# Patient Record
Sex: FEMALE | Race: WHITE | Employment: PART TIME | URBAN - METROPOLITAN AREA
[De-identification: names, ages, dates, MRNs, and addresses within clinical notes are randomized per-mention and may not be internally consistent; named-entity substitution may affect disease eponyms.]

---

## 2017-03-07 ENCOUNTER — OFFICE VISIT (OUTPATIENT)
Dept: INTERNAL MEDICINE CLINIC | Age: 57
End: 2017-03-07

## 2017-03-07 ENCOUNTER — OFFICE VISIT (OUTPATIENT)
Dept: ORTHOPEDIC SURGERY | Age: 57
End: 2017-03-07

## 2017-03-07 VITALS
RESPIRATION RATE: 16 BRPM | TEMPERATURE: 98.1 F | HEIGHT: 62 IN | DIASTOLIC BLOOD PRESSURE: 86 MMHG | OXYGEN SATURATION: 97 % | WEIGHT: 120 LBS | HEART RATE: 89 BPM | SYSTOLIC BLOOD PRESSURE: 136 MMHG | BODY MASS INDEX: 22.08 KG/M2

## 2017-03-07 VITALS
RESPIRATION RATE: 16 BRPM | OXYGEN SATURATION: 98 % | DIASTOLIC BLOOD PRESSURE: 85 MMHG | SYSTOLIC BLOOD PRESSURE: 128 MMHG | HEART RATE: 86 BPM | TEMPERATURE: 98.4 F | HEIGHT: 62 IN | BODY MASS INDEX: 22.08 KG/M2 | WEIGHT: 120 LBS

## 2017-03-07 VITALS
WEIGHT: 120 LBS | HEART RATE: 86 BPM | DIASTOLIC BLOOD PRESSURE: 85 MMHG | TEMPERATURE: 98.4 F | SYSTOLIC BLOOD PRESSURE: 128 MMHG | OXYGEN SATURATION: 98 % | HEIGHT: 62 IN | RESPIRATION RATE: 16 BRPM | BODY MASS INDEX: 22.08 KG/M2

## 2017-03-07 DIAGNOSIS — S92.302A AVULSION FRACTURE OF METATARSAL BONE OF LEFT FOOT, CLOSED, INITIAL ENCOUNTER: Primary | ICD-10-CM

## 2017-03-07 DIAGNOSIS — S92.352A CLOSED FRACTURE OF FIFTH METATARSAL BONE OF LEFT FOOT, PHYSEAL INVOLVEMENT UNSPECIFIED, INITIAL ENCOUNTER: Primary | ICD-10-CM

## 2017-03-07 DIAGNOSIS — M79.672 LEFT FOOT PAIN: ICD-10-CM

## 2017-03-07 PROBLEM — S92.353A CLOSED FRACTURE OF FIFTH METATARSAL BONE: Status: ACTIVE | Noted: 2017-03-07

## 2017-03-07 RX ORDER — HYDROCODONE BITARTRATE AND ACETAMINOPHEN 5; 325 MG/1; MG/1
1 TABLET ORAL
Qty: 45 TAB | Refills: 0 | Status: SHIPPED | OUTPATIENT
Start: 2017-03-07

## 2017-03-07 NOTE — PROGRESS NOTES
HISTORY OF PRESENT ILLNESS  Moody Guardado is a 60 y.o. female. Foot Pain   The history is provided by the patient. This is a new problem. The current episode started 1 to 2 hours ago. The problem occurs constantly. The problem has not changed since onset. Pertinent negatives include no chest pain, no headaches and no shortness of breath. Associated symptoms comments: Swelling, limited ROM. The symptoms are aggravated by walking, standing, bending and twisting. Nothing relieves the symptoms. She has tried a cold compress for the symptoms. The treatment provided no relief. Review of Systems   Constitutional: Negative for chills, fever and malaise/fatigue. Respiratory: Negative for shortness of breath. Cardiovascular: Negative for chest pain and palpitations. Musculoskeletal: Positive for joint pain. Negative for back pain, falls, myalgias and neck pain. Neurological: Negative for dizziness, tingling and headaches. Endo/Heme/Allergies: Negative for polydipsia. Psychiatric/Behavioral: The patient is not nervous/anxious. Physical Exam   Constitutional: She is oriented to person, place, and time. She appears well-developed and well-nourished. No distress. HENT:   Head: Normocephalic and atraumatic. Eyes: Pupils are equal, round, and reactive to light. Neck: Neck supple. Cardiovascular: Regular rhythm. Pulmonary/Chest: Breath sounds normal. No respiratory distress. Musculoskeletal:        Left ankle: She exhibits decreased range of motion. She exhibits no swelling and no deformity. No tenderness. No lateral malleolus and no medial malleolus tenderness found. Left foot: There is decreased range of motion, tenderness, bony tenderness and swelling. There is normal capillary refill, no crepitus and no laceration. Neurological: She is alert and oriented to person, place, and time. No cranial nerve deficit. Skin: Skin is warm. She is not diaphoretic. No erythema. Psychiatric: She has a normal mood and affect. Nursing note and vitals reviewed. ASSESSMENT and PLAN    ICD-10-CM ICD-9-CM    1. Closed fracture of fifth metatarsal bone of left foot, physeal involvement unspecified, initial encounter S92.352A 825.25 REFERRAL TO ORTHOPEDICS   2. Left foot pain M79.672 729.5 XR FOOT LT MIN 3 V      XR ANKLE LT MIN 3 V      REFERRAL TO ORTHOPEDICS      CANCELED: XR ANKLE LT AP/LAT   patient will be further evaluated and care plan will be established by orthopedic specialist. Most of the time spent was in co ordination of care. Will follow up if necessary.

## 2017-03-07 NOTE — Clinical Note
Thank you very much for sending me this patient. Please see my note for details. I plan to follow until resolved.

## 2017-03-07 NOTE — MR AVS SNAPSHOT
Visit Information Date & Time Provider Department Dept. Phone Encounter #  
 3/7/2017  1:15 PM KRISTINE [facility] [address] [id number] Upcoming Health Maintenance Date Due Hepatitis C Screening 1960 DTaP/Tdap/Td series (1 - Tdap) 9/18/1979 BREAST CANCER SCRN MAMMOGRAM 9/18/2008 FOBT Q 1 YEAR AGE 50-75 9/18/2008 PAP AKA CERVICAL CYTOLOGY 7/28/2014 INFLUENZA AGE 9 TO ADULT 8/1/2016 Allergies as of 3/7/2017  Review Complete On: 3/7/2017 By: Jadyn Munguia LPN Severity Noted Reaction Type Reactions Celebrex [Celecoxib]  06/20/2016    Diarrhea Current Immunizations  Never Reviewed No immunizations on file. Not reviewed this visit You Were Diagnosed With   
  
 Codes Comments Left foot pain    -  Primary ICD-10-CM: U33.490 ICD-9-CM: 729.5 Vitals BP Pulse Temp Resp Height(growth percentile) Weight(growth percentile) 136/86 (BP 1 Location: Right arm, BP Patient Position: Sitting) 89 98.1 °F (36.7 °C) (Oral) 16 5' 2\" (1.575 m) 120 lb (54.4 kg) SpO2 BMI OB Status Smoking Status 97% 21.95 kg/m2 Hysterectomy Former Smoker Vitals History BMI and BSA Data Body Mass Index Body Surface Area  
 21.95 kg/m 2 1.54 m 2 Preferred Pharmacy Pharmacy Name Phone [facility] [address] [phone] Your Updated Medication List  
  
   
This list is accurate as of: 3/7/17  2:00 PM.  Always use your most recent med list.  
  
  
  
  
 * ASACOL  mg DR tablet Generic drug:  mesalamine DR Take 800 mg by mouth nightly. Indications: ULCERATIVE COLITIS  
  
 * mesalamine  mg DR tablet Commonly known as:  ASACOL HD Take 1,600 mg by mouth two (2) times a day. 1600 MG AM, 800 MG PM  Indications: ULCERATIVE COLITIS buPROPion  mg SR tablet Commonly known as:  Dariana Larios  
 150 mg two (2) times a day. Indications: depression  
  
 cetirizine 10 mg tablet Commonly known as:  ZYRTEC Take 10 mg by mouth nightly. Indications: ALLERGIC RHINITIS Desvenlafaxine 100 mg Tb24 Take 100 mg by mouth daily. Indications: depression * fluticasone 110 mcg/actuation inhaler Commonly known as:  FLOVENT HFA Take 2 Puffs by inhalation every twelve (12) hours. Indications: asthma * fluticasone 50 mcg/actuation nasal spray Commonly known as:  Dorian Riser 2 Sprays by Both Nostrils route nightly. Indications: ALLERGIC RHINITIS, can take AM dose if feels like she needs it  
  
 glucagon 1 mg injection Commonly known as:  GLUCAGEN  
1 mg as needed. Indications: emergency treatment for if hypoglycemic  
  
 insulin lispro 100 unit/mL injection Commonly known as:  HUMALOG  
by SubCUTAneous route. Via insulin pump basal - (midnight- 09:30am = .80mg/hr)  (09:30am - midnight -.65mg/hr) LIPITOR 20 mg tablet Generic drug:  atorvastatin Take 30 mg by mouth nightly. Indications: HYPERCHOLESTEROLEMIA LORazepam 0.5 mg tablet Commonly known as:  ATIVAN Take 0.5 mg by mouth every four (4) hours as needed for Anxiety. multivitamin tablet Commonly known as:  ONE A DAY Take 1 Tab by mouth daily. oxyCODONE-acetaminophen 5-325 mg per tablet Commonly known as:  PERCOCET Take 1 to 2 tab PO q 4-6 hrs prn pain VENTOLIN HFA 90 mcg/actuation inhaler Generic drug:  albuterol 2 Puffs every four (4) hours as needed. Indications: ACUTE ASTHMA ATTACK  
  
 VITAMIN D3 2,000 unit Cap capsule Generic drug:  Cholecalciferol (Vitamin D3) Take  by Mouth Once a Day. * Notice: This list has 4 medication(s) that are the same as other medications prescribed for you. Read the directions carefully, and ask your doctor or other care provider to review them with you. We Performed the Following REFERRAL TO ORTHOPEDICS [YGM308 Custom] Comments: Please evaluate patient for foot pain To-Do List   
 03/07/2017 Imaging:  XR FOOT LT MIN 3 V As directed Imaging:  XR ANKLE LT MIN 3 V Referral Information Referral ID Referred By Referred To  
  
 [id number], [address] [facility] Phone: [phone] Fax: [phone] Visits Status Start Date End Date 1 New Request 3/7/17 3/7/18 If your referral has a status of pending review or denied, additional information will be sent to support the outcome of this decision. Introducing [facility]! Dear Virginia Díaz: Thank you for requesting a [employer] account. Our records indicate that you already have an active [employer] account. You can access your account anytime at [email or web address] Did you know that you can access your hospital and ER discharge instructions at any time in [employer]? You can also review all of your test results from your hospital stay or ER visit. Additional Information If you have questions, please visit the Frequently Asked Questions section of the [employer] website at [email or web address] Remember, [employer] is NOT to be used for urgent needs. For medical emergencies, dial 911. Now available from your iPhone and Android! Please provide this summary of care documentation to your next provider. Your primary care clinician is listed as Gold Bailey. If you have any questions after today's visit, please call [phone].

## 2017-03-07 NOTE — PROGRESS NOTES
1. Have you been to the ER, urgent care clinic since your last visit? Hospitalized since your last visit? new to  practice    2. Have you seen or consulted any other health care providers outside of the [address] [place] [address] since your last visit? Include any pap smears or colon screening.   new to  practice

## 2017-03-07 NOTE — PATIENT INSTRUCTIONS
Fifth Metatarsal Fracture: Rehab Exercises  Your Care Instructions  Here are some examples of typical rehabilitation exercises for your condition. Start each exercise slowly. Ease off the exercise if you start to have pain. Your doctor or physical therapist will tell you when you can start these exercises and which ones will work best for you. How to do the exercises  Calf wall stretch (back knee straight)    1. Stand facing a wall with your hands on the wall at about eye level. Put your affected foot about a step behind your other foot. 2. Keeping your back leg straight and your back heel on the floor, bend your front knee and gently bring your hip and chest toward the wall until you feel a stretch in the calf of your back leg. 3. Hold the stretch for at least 15 to 30 seconds. 4. Repeat 2 to 4 times. Calf wall stretch (knees bent)    1. Stand facing a wall with your hands on the wall at about eye level. Put your affected foot about a step behind your other foot. 2. Keeping both heels on the floor, bend both knees. Then gently bring your hip and chest toward the wall until you feel a stretch in the calf of your back leg. 3. Hold the stretch for at least 15 to 30 seconds. 4. Repeat 2 to 4 times. [place] pick-ups    1. Put some marbles on the floor next to a cup.  2. Sit in a chair, and use the toes of your affected foot to lift up one marble from the floor at a time. Then try to put the marble in the cup.  3. Repeat 8 to 12 times. Towel scrunches    1. Sit in a chair, and place both feet on a towel on the floor. 2. Scrunch the towel toward you with your toes. Then use your toes to push the towel back into place. 3. Repeat 8 to 12 times. Towel inversion and eversion    1. Sit in a chair, and place both feet on a towel on the floor. 2. Swivel your feet from side to side to slide the towel. First slide your toes, then your heels, as you move the towel with your feet.  Then change directions and swivel your feet from side to side to slide the towel back to the starting position. 3. Repeat 8 to 12 times. Resisted ankle inversion    1. Sit on the floor with your good foot crossed over your affected foot. 2. Hold both ends of an exercise band, and loop the band around the inside of your affected foot. Then press your other foot against the band. 3. Keeping your feet crossed, slowly push your affected foot against the band so that foot moves away from your other foot. Then slowly relax. 4. Repeat 8 to 12 times. Resisted ankle eversion    1. Sit on the floor with your legs straight. 2. Hold both ends of an exercise band, and loop the band around the outside of your affected foot. Then press your other foot against the band. 3. Keeping your leg straight, slowly push your affected foot outward against the band and away from your other foot without letting your leg rotate. Then slowly relax. 4. Repeat 8 to 12 times. Follow-up care is a key part of your treatment and safety. Be sure to make and go to all appointments, and call your doctor if you are having problems. It's also a good idea to know your test results and keep a list of the medicines you take. Where can you learn more? Go to http://michel-dallin.info/. Enter [phone] in the search box to learn more about \"Fifth Metatarsal Fracture: Rehab Exercises. \"  Current as of: May 23, 2016  Content Version: 11.1  © 3169-8997 [employer]. Care instructions adapted under license by [employer] (which disclaims liability or warranty for this information). If you have questions about a medical condition or this instruction, always ask your healthcare professional. [address] any warranty or liability for your use of this information.

## 2017-03-07 NOTE — PROGRESS NOTES
Pt presented today with foot pain, pt reports she fell off a curb one hour ago and now has pain to left foot. Has pt had any falls since last visit? Yes 03/. Pt preferred language for health care discussion is english. Advanced Directive? No    Is someone accompanying this pt? Yes     Is the patient using any DME equipment during [address]? Yes wheelchair      1. Have you been to the ER, urgent care clinic since your last visit? Hospitalized since your last visit? No    2. Have you seen or consulted any other health care providers outside of the [facility] since your last visit? Include any pap smears or colon screening. No      Pt has a reminder for a \"due or due soon\" health maintenance. I have asked that she contact his primary care provider for follow-up on this health maintenance.

## 2017-03-07 NOTE — PROGRESS NOTES
HISTORY OF PRESENT ILLNESS    Virginia Díaz is a 60y.o. year old female comes in today as new patient to be evaluated and treated at the request of Roger Pennington for my opinion/advice regarding: left foot pain    Was walking today and went over a curb and rolled ankle and  A lot of pain left lateral foot. Swelled and unable to walk on it so carried here and wheelchair after. IMAGING: left foot xray 3/7/17 avulsion Fx proximal 5th metatarsal    Social History     Social History    Marital status:      Spouse name: N/A    Number of children: N/A    Years of education: N/A     Occupational History    dance teacher      Social History Main Topics    Smoking status: Former Smoker    Smokeless tobacco: Never Used    Alcohol use 0.6 oz/week     1 Glasses of wine per week    Drug use: No    Sexual activity: Yes     Partners: Male     Birth control/ protection: None     Other Topics Concern    None     Social History Narrative     Current Outpatient Prescriptions   Medication Sig Dispense Refill    mesalamine DR (ASACOL HD) 800 mg DR tablet Take 1,600 mg by mouth two (2) times a day. 1600 MG AM, 800 MG PM  Indications: ULCERATIVE COLITIS      mesalamine DR (ASACOL HD) 800 mg DR tablet Take 800 mg by mouth nightly. Indications: ULCERATIVE COLITIS      multivitamin (ONE A DAY) tablet Take 1 Tab by mouth daily.  insulin lispro (HUMALOG) 100 unit/mL injection by SubCUTAneous route. Via insulin pump  basal - (midnight- 09:30am = .80mg/hr)   (09:30am - midnight -.65mg/hr)      LORazepam (ATIVAN) 0.5 mg tablet Take 0.5 mg by mouth every four (4) hours as needed for Anxiety.  Desvenlafaxine 100 mg Tb24 Take 100 mg by mouth daily. Indications: depression      fluticasone (FLONASE) 50 mcg/actuation nasal spray 2 Sprays by Both Nostrils route nightly.  Indications: ALLERGIC RHINITIS, can take AM dose if feels like she needs it      albuterol (VENTOLIN HFA) 90 mcg/actuation inhaler 2 Puffs every four (4) hours as needed. Indications: ACUTE ASTHMA ATTACK      atorvastatin (LIPITOR) 20 mg tablet Take 30 mg by mouth nightly. Indications: HYPERCHOLESTEROLEMIA      buPROPion SR (WELLBUTRIN SR) 150 mg SR tablet 150 mg two (2) times a day. Indications: depression      cetirizine (ZYRTEC) 10 mg tablet Take 10 mg by mouth nightly. Indications: ALLERGIC RHINITIS      Cholecalciferol, Vitamin D3, (VITAMIN D3) 2,000 unit cap capsule Take  by Mouth Once a Day.  fluticasone (FLOVENT HFA) 110 mcg/actuation inhaler Take 2 Puffs by inhalation every twelve (12) hours. Indications: asthma      glucagon (GLUCAGEN) 1 mg injection 1 mg as needed. Indications: emergency treatment for if hypoglycemic      oxyCODONE-acetaminophen (PERCOCET) 5-325 mg per tablet Take 1 to 2 tab PO q 4-6 hrs prn pain 60 Tab 0     Past Medical History:   Diagnosis Date    Arthritis     Asthma     [facility]immune disease ([facility] [address]     colitis    Bronchitis     Contact dermatitis and other eczema, due to unspecified cause     Depression     Diabetes ([facility] [address] 2012    Hypercholesterolemia     Osteoarthritis of left hip 6/14/2016    Osteoarthritis of right hip 11/4/2016     Family History   Problem Relation Age of Onset    Cancer Mother     Elevated Lipids Mother     Cancer Father     Heart Disease Father     Hypertension Father     Elevated Lipids Father      ROS:  All other systems reviewed and negative aside from that written in the HPI. Objective:  Visit Vitals    /85 (BP 1 Location: Right arm, BP Patient Position: Sitting)    Pulse 86    Temp 98.4 °F (36.9 °C) (Oral)    Resp 16    Ht 5' 2\" (1.575 m)    Wt 120 lb (54.4 kg)    SpO2 98%    BMI 21.95 kg/m2     HEENT: Conjunctiva/lids WNL. External canals/nares WNL. Tongue midline. PERRL, EOMI. Hearing intact. NECK: Trachea midline. Supple, Full ROM. CARDIAC: RRR. S1S2. No Murmur. LUNGS: CTAB w/ normal effort. ABD: Soft, NT. No HSM. PSYCH: A+O x3.  Appropriate judgment and insight. GEN: Appears stated age in NAD. HEAD:  Normocephalic, atraumatic. NEURO:  Sensation intact light touch B/L lower extremities. MS:  Strength normal throughout upper and lower extremities bilateral .   left ankle/foot:  Positive tenderness at proximal 5th metatarsal.   Anterior drawer test negative. Talar tilt negative. Kleiger test negative. Syndesmosis squeeze negative. negative fibular head tenderness. Fibular head motion normal. Gait normal.  no clubbing/cyanosis. ROM normal.  EXT: no clubbing/cyanosis. no edema. SKIN: Warm/dry without rash. Assessment/Plan:     ICD-10-CM ICD-9-CM    1. Avulsion fracture of metatarsal bone of left foot, closed, initial encounter S92.302A 825.25 HYDROcodone-acetaminophen (NORCO) 5-325 mg per tablet      AMB SUPPLY ORDER       Patient verbalizes understanding of evaluation and plan. Will start crutches and boot and try to get walker for knee and norco PRN. Use ice often and RTC 2 weeks.

## 2017-03-07 NOTE — PROGRESS NOTES
1. Have you been to the ER, urgent care clinic since your last visit? Hospitalized since your last visit? new to  practice    2. Have you seen or consulted any other health care providers outside of the [address] [place] [address] since your last visit? Include any pap smears or colon screening.   new to  practice

## 2017-03-07 NOTE — PROGRESS NOTES
HISTORY OF PRESENT ILLNESS    Kari David is a 60y.o. year old female comes in today as new patient to be evaluated and treated at the request of Donnella Ahumada for my opinion/advice regarding: foot pain left     Patients symptoms have been present for *** {days/wks/mos/yrs:608730}. Pain level ***/10 ***, It {BETTER/WORSE:00365} with ***. Patient has tried:  ***. It is described as ***. IMAGING: ***    Social History     Social History    Marital status:      Spouse name: N/A    Number of children: N/A    Years of education: N/A     Occupational History    dance teacher      Social History Main Topics    Smoking status: Former Smoker    Smokeless tobacco: Never Used    Alcohol use 0.6 oz/week     1 Glasses of wine per week    Drug use: No    Sexual activity: Yes     Partners: Male     Birth control/ protection: None     Other Topics Concern    None     Social History Narrative     Current Outpatient Prescriptions   Medication Sig Dispense Refill    mesalamine DR (ASACOL HD) 800 mg DR tablet Take 1,600 mg by mouth two (2) times a day. 1600 MG AM, 800 MG PM  Indications: ULCERATIVE COLITIS      mesalamine DR (ASACOL HD) 800 mg DR tablet Take 800 mg by mouth nightly. Indications: ULCERATIVE COLITIS      multivitamin (ONE A DAY) tablet Take 1 Tab by mouth daily.  insulin lispro (HUMALOG) 100 unit/mL injection by SubCUTAneous route. Via insulin pump  basal - (midnight- 09:30am = .80mg/hr)   (09:30am - midnight -.65mg/hr)      LORazepam (ATIVAN) 0.5 mg tablet Take 0.5 mg by mouth every four (4) hours as needed for Anxiety.  Desvenlafaxine 100 mg Tb24 Take 100 mg by mouth daily. Indications: depression      fluticasone (FLONASE) 50 mcg/actuation nasal spray 2 Sprays by Both Nostrils route nightly. Indications: ALLERGIC RHINITIS, can take AM dose if feels like she needs it      albuterol (VENTOLIN HFA) 90 mcg/actuation inhaler 2 Puffs every four (4) hours as needed.  Indications: ACUTE ASTHMA ATTACK      atorvastatin (LIPITOR) 20 mg tablet Take 30 mg by mouth nightly. Indications: HYPERCHOLESTEROLEMIA      buPROPion SR (WELLBUTRIN SR) 150 mg SR tablet 150 mg two (2) times a day. Indications: depression      cetirizine (ZYRTEC) 10 mg tablet Take 10 mg by mouth nightly. Indications: ALLERGIC RHINITIS      Cholecalciferol, Vitamin D3, (VITAMIN D3) 2,000 unit cap capsule Take  by Mouth Once a Day.  fluticasone (FLOVENT HFA) 110 mcg/actuation inhaler Take 2 Puffs by inhalation every twelve (12) hours. Indications: asthma      glucagon (GLUCAGEN) 1 mg injection 1 mg as needed. Indications: emergency treatment for if hypoglycemic      oxyCODONE-acetaminophen (PERCOCET) 5-325 mg per tablet Take 1 to 2 tab PO q 4-6 hrs prn pain 60 Tab 0     Past Medical History:   Diagnosis Date    Arthritis     Asthma     Autoimmune disease ([facility] [address]     colitis    Bronchitis     Contact dermatitis and other eczema, due to unspecified cause     Depression     Diabetes ([facility] [address] 2012    Hypercholesterolemia     Osteoarthritis of left hip 6/14/2016    Osteoarthritis of right hip 11/4/2016     Family History   Problem Relation Age of Onset    Cancer Mother     Elevated Lipids Mother     Cancer Father     Heart Disease Father     Hypertension Father     Elevated Lipids Father          ROS:  ***  All other systems reviewed and negative aside from that written in the HPI. Objective:  Visit Vitals    /85 (BP 1 Location: Right arm, BP Patient Position: Sitting)    Pulse 86    Temp 98.4 °F (36.9 °C) (Oral)    Resp 16    Ht 5' 2\" (1.575 m)    Wt 120 lb (54.4 kg)    SpO2 98%    BMI 21.95 kg/m2     HEENT: Conjunctiva/lids WNL. External canals/nares WNL. Tongue midline. PERRL, EOMI. Hearing intact. NECK: Trachea midline. Supple, Full ROM. CARDIAC: RRR. S1S2. No Murmur. LUNGS: CTAB w/ normal effort. ABD: Soft, NT. No HSM. PSYCH: A+O x3.  Appropriate judgment and insight. ***    Assessment/Plan:   {Assessment and Plan Chronic Disease:96460}    Patient verbalizes understanding of evaluation and plan. *** Note to referring provider sent.

## 2017-03-07 NOTE — MR AVS SNAPSHOT
Visit Information Date & Time Provider Department Dept. Phone Encounter #  
 3/7/2017  3:20 PM Ry Armendariz, [address] [facility] [phone] [id number] Follow-up Instructions Return in about 2 weeks (around 3/21/2017) for left foot Fx. Routing History Upcoming Health Maintenance Date Due Hepatitis C Screening 1960 DTaP/Tdap/Td series (1 - Tdap) 9/18/1979 BREAST CANCER SCRN MAMMOGRAM 9/18/2008 FOBT Q 1 YEAR AGE 50-75 9/18/2008 PAP AKA CERVICAL CYTOLOGY 7/28/2014 INFLUENZA AGE 9 TO ADULT 8/1/2016 Allergies as of 3/7/2017  Review Complete On: 3/7/2017 By: Ry Armendariz DO Severity Noted Reaction Type Reactions Celebrex [Celecoxib]  06/20/2016    Diarrhea Current Immunizations  Never Reviewed No immunizations on file. Not reviewed this visit You Were Diagnosed With   
  
 Codes Comments Avulsion fracture of metatarsal bone of left foot, closed, initial encounter    -  Primary ICD-10-CM: P72.842C ICD-9-CM: 825.25 Vitals BP Pulse Temp Resp Height(growth percentile) Weight(growth percentile) 128/85 (BP 1 Location: Right arm, BP Patient Position: Sitting) 86 98.4 °F (36.9 °C) (Oral) 16 5' 2\" (1.575 m) 120 lb (54.4 kg) SpO2 BMI OB Status Smoking Status 98% 21.95 kg/m2 Hysterectomy Former Smoker BMI and BSA Data Body Mass Index Body Surface Area  
 21.95 kg/m 2 1.54 m 2 Preferred Pharmacy Pharmacy Name Phone [facility] [address] [facility], [address] [place] [address] [phone] Your Updated Medication List  
  
   
This list is accurate as of: 3/7/17  2:46 PM.  Always use your most recent med list.  
  
  
  
  
 * ASACOL  mg DR tablet Generic drug:  mesalamine DR Take 800 mg by mouth nightly. Indications: ULCERATIVE COLITIS  
  
 * mesalamine  mg DR tablet Commonly known as:  ASACOL HD  
 Take 1,600 mg by mouth two (2) times a day. 1600 MG AM, 800 MG PM  Indications: ULCERATIVE COLITIS buPROPion  mg SR tablet Commonly known as:  WELLBUTRIN SR  
150 mg two (2) times a day. Indications: depression  
  
 cetirizine 10 mg tablet Commonly known as:  ZYRTEC Take 10 mg by mouth nightly. Indications: ALLERGIC RHINITIS Desvenlafaxine 100 mg Tb24 Take 100 mg by mouth daily. Indications: depression * fluticasone 110 mcg/actuation inhaler Commonly known as:  FLOVENT HFA Take 2 Puffs by inhalation every twelve (12) hours. Indications: asthma * fluticasone 50 mcg/actuation nasal spray Commonly known as:  Cody Cyphers 2 Sprays by Both Nostrils route nightly. Indications: ALLERGIC RHINITIS, can take AM dose if feels like she needs it  
  
 glucagon 1 mg injection Commonly known as:  GLUCAGEN  
1 mg as needed. Indications: emergency treatment for if hypoglycemic HYDROcodone-acetaminophen 5-325 mg per tablet Commonly known as:  Jason Anderson Take 1 Tab by mouth every eight (8) hours as needed for Pain. Max Daily Amount: 3 Tabs. insulin lispro 100 unit/mL injection Commonly known as:  HUMALOG  
by SubCUTAneous route. Via insulin pump basal - (midnight- 09:30am = .80mg/hr)  (09:30am - midnight -.65mg/hr) LIPITOR 20 mg tablet Generic drug:  atorvastatin Take 30 mg by mouth nightly. Indications: HYPERCHOLESTEROLEMIA LORazepam 0.5 mg tablet Commonly known as:  ATIVAN Take 0.5 mg by mouth every four (4) hours as needed for Anxiety. multivitamin tablet Commonly known as:  ONE A DAY Take 1 Tab by mouth daily. VENTOLIN HFA 90 mcg/actuation inhaler Generic drug:  albuterol 2 Puffs every four (4) hours as needed. Indications: ACUTE ASTHMA ATTACK  
  
 VITAMIN D3 2,000 unit Cap capsule Generic drug:  Cholecalciferol (Vitamin D3) Take  by Mouth Once a Day. * Notice:   This list has 4 medication(s) that are the same as other medications prescribed for you. Read the directions carefully, and ask your doctor or other care provider to review them with you. Prescriptions Printed Refills HYDROcodone-acetaminophen (NORCO) 5-325 mg per tablet 0 Sig: Take 1 Tab by mouth every eight (8) hours as needed for Pain. Max Daily Amount: 3 Tabs. Class: Print Route: Oral  
  
We Performed the Following AMB SUPPLY ORDER [[id number] Custom] Comments:  
 Knee walker Dx: Left proximal 5th metatarsal avulsion fracture AMB SUPPLY ORDER [[id number] Custom] Comments:  
 High tide boot Crutches Dx: 
Avulsion fracture of metatarsal bone of left foot, closed, initial encounter  (primary encounter diagnosis) Follow-up Instructions Return in about 2 weeks (around 3/21/2017) for left foot Fx. To-Do List   
 03/07/2017 3:20 PM  
  Appointment with Raven Roth DO at [address] [place] [address], [address] [facility] [phone]) Patient Instructions Fifth Metatarsal Fracture: Rehab Exercises Your Care Instructions Here are some examples of typical rehabilitation exercises for your condition. Start each exercise slowly. Ease off the exercise if you start to have pain. Your doctor or physical therapist will tell you when you can start these exercises and which ones will work best for you. How to do the exercises Calf wall stretch (back knee straight) 1. Stand facing a wall with your hands on the wall at about eye level. Put your affected foot about a step behind your other foot. 2. Keeping your back leg straight and your back heel on the floor, bend your front knee and gently bring your hip and chest toward the wall until you feel a stretch in the calf of your back leg. 3. Hold the stretch for at least 15 to 30 seconds. 4. Repeat 2 to 4 times. Calf wall stretch (knees bent) 1. Stand facing a wall with your hands on the wall at about eye level.  Put your affected foot about a step behind your other foot. 2. Keeping both heels on the floor, bend both knees. Then gently bring your hip and chest toward the wall until you feel a stretch in the calf of your back leg. 3. Hold the stretch for at least 15 to 30 seconds. 4. Repeat 2 to 4 times. [place] pick-ups 1. Put some marbles on the floor next to a cup. 
2. Sit in a chair, and use the toes of your affected foot to lift up one marble from the floor at a time. Then try to put the marble in the cup. 
3. Repeat 8 to 12 times. Towel scrunches 1. Sit in a chair, and place both feet on a towel on the floor. 2. Scrunch the towel toward you with your toes. Then use your toes to push the towel back into place. 3. Repeat 8 to 12 times. Towel inversion and eversion 1. Sit in a chair, and place both feet on a towel on the floor. 2. Swivel your feet from side to side to slide the towel. First slide your toes, then your heels, as you move the towel with your feet. Then change directions and swivel your feet from side to side to slide the towel back to the starting position. 3. Repeat 8 to 12 times. Resisted ankle inversion 1. Sit on the floor with your good foot crossed over your affected foot. 2. Hold both ends of an exercise band, and loop the band around the inside of your affected foot. Then press your other foot against the band. 3. Keeping your feet crossed, slowly push your affected foot against the band so that foot moves away from your other foot. Then slowly relax. 4. Repeat 8 to 12 times. Resisted ankle eversion 1. Sit on the floor with your legs straight. 2. Hold both ends of an exercise band, and loop the band around the outside of your affected foot. Then press your other foot against the band. 3. Keeping your leg straight, slowly push your affected foot outward against the band and away from your other foot without letting your leg rotate. Then slowly relax. 4. Repeat 8 to 12 times. Follow-up care is a key part of your treatment and safety. Be sure to make and go to all appointments, and call your doctor if you are having problems. It's also a good idea to know your test results and keep a list of the medicines you take. Where can you learn more? Go to http://michel-dallin.info/. Enter [id number] in the search box to learn more about \"Fifth Metatarsal Fracture: Rehab Exercises. \" Current as of: May 23, 2016 Content Version: 11.1 © 3490-9479 [employer]. Care instructions adapted under license by [employer] (which disclaims liability or warranty for this information). If you have questions about a medical condition or this instruction, always ask your healthcare professional. [address] any warranty or liability for your use of this information. Introducing [facility]! Dear Sarah Horan: Thank you for requesting a ERPLY account. Our records indicate that you already have an active ERPLY account. You can access your account anytime at [email or web address] Did you know that you can access your hospital and ER discharge instructions at any time in ERPLY? You can also review all of your test results from your hospital stay or ER visit. Additional Information If you have questions, please visit the Frequently Asked Questions section of the ERPLY website at [email or web address] Remember, ERPLY is NOT to be used for urgent needs. For medical emergencies, dial 911. Now available from your iPhone and Android! Please provide this summary of care documentation to your next provider. Your primary care clinician is listed as Valentino Kung. If you have any questions after today's visit, please call [phone].

## 2017-03-21 ENCOUNTER — OFFICE VISIT (OUTPATIENT)
Dept: ORTHOPEDIC SURGERY | Age: 57
End: 2017-03-21

## 2017-03-21 VITALS
HEIGHT: 62 IN | HEART RATE: 101 BPM | WEIGHT: 125 LBS | BODY MASS INDEX: 23 KG/M2 | OXYGEN SATURATION: 99 % | SYSTOLIC BLOOD PRESSURE: 153 MMHG | RESPIRATION RATE: 16 BRPM | TEMPERATURE: 97 F | DIASTOLIC BLOOD PRESSURE: 91 MMHG

## 2017-03-21 DIAGNOSIS — S92.301D: Primary | ICD-10-CM

## 2017-03-21 NOTE — PROGRESS NOTES
1. Have you been to the ER, urgent care clinic since your last visit? Hospitalized since your last visit? No    2. Have you seen or consulted any other health care providers outside of the [address] [place] [address] since your last visit? Include any pap smears or colon screening.  No\

## 2017-03-21 NOTE — PROGRESS NOTES
HISTORY OF PRESENT ILLNESS    Catalina Burk is a 60y.o. year old female comes in today to be evaluated and treated for: left foot fracture    Patients Since last appt has been doing well with boot and only twinges with walking in it so using rollator. No pain med since first appt aside from tylenol. Social History     Social History    Marital status:      Spouse name: N/A    Number of children: N/A    Years of education: N/A     Occupational History    dance teacher      Social History Main Topics    Smoking status: Former Smoker    Smokeless tobacco: Never Used    Alcohol use 0.6 oz/week     1 Glasses of wine per week    Drug use: No    Sexual activity: Yes     Partners: Male     Birth control/ protection: None     Other Topics Concern    Not on file     Social History Narrative     Current Outpatient Prescriptions   Medication Sig Dispense Refill    HYDROcodone-acetaminophen (NORCO) 5-325 mg per tablet Take 1 Tab by mouth every eight (8) hours as needed for Pain. Max Daily Amount: 3 Tabs. 45 Tab 0    mesalamine DR (ASACOL HD) 800 mg DR tablet Take 1,600 mg by mouth two (2) times a day. 1600 MG AM, 800 MG PM  Indications: ULCERATIVE COLITIS      mesalamine DR (ASACOL HD) 800 mg DR tablet Take 800 mg by mouth nightly. Indications: ULCERATIVE COLITIS      multivitamin (ONE A DAY) tablet Take 1 Tab by mouth daily.  insulin lispro (HUMALOG) 100 unit/mL injection by SubCUTAneous route. Via insulin pump  basal - (midnight- 09:30am = .80mg/hr)   (09:30am - midnight -.65mg/hr)      LORazepam (ATIVAN) 0.5 mg tablet Take 0.5 mg by mouth every four (4) hours as needed for Anxiety.  Desvenlafaxine 100 mg Tb24 Take 100 mg by mouth daily. Indications: depression      fluticasone (FLONASE) 50 mcg/actuation nasal spray 2 Sprays by Both Nostrils route nightly.  Indications: ALLERGIC RHINITIS, can take AM dose if feels like she needs it      albuterol (VENTOLIN HFA) 90 mcg/actuation inhaler 2 Puffs every four (4) hours as needed. Indications: ACUTE ASTHMA ATTACK      atorvastatin (LIPITOR) 20 mg tablet Take 30 mg by mouth nightly. Indications: HYPERCHOLESTEROLEMIA      buPROPion SR (WELLBUTRIN SR) 150 mg SR tablet 150 mg two (2) times a day. Indications: depression      cetirizine (ZYRTEC) 10 mg tablet Take 10 mg by mouth nightly. Indications: ALLERGIC RHINITIS      Cholecalciferol, Vitamin D3, (VITAMIN D3) 2,000 unit cap capsule Take  by Mouth Once a Day.  fluticasone (FLOVENT HFA) 110 mcg/actuation inhaler Take 2 Puffs by inhalation every twelve (12) hours. Indications: asthma      glucagon (GLUCAGEN) 1 mg injection 1 mg as needed. Indications: emergency treatment for if hypoglycemic       Past Medical History:   Diagnosis Date    Arthritis     Asthma     Autoimmune disease ([facility] [address]     colitis    Bronchitis     Contact dermatitis and other eczema, due to unspecified cause     Depression     Diabetes ([facility] [address] 2012    Hypercholesterolemia     Osteoarthritis of left hip 6/14/2016    Osteoarthritis of right hip 11/4/2016     Family History   Problem Relation Age of Onset    Cancer Mother     Elevated Lipids Mother     Cancer Father     Heart Disease Father     Hypertension Father     Elevated Lipids Father          ROS:  + swell, bruise, no numb    Objective:  Visit Vitals    BP (!) 153/91 (BP 1 Location: Right arm, BP Patient Position: Sitting)    Pulse (!) 101    Temp 97 °F (36.1 °C) (Oral)    Resp 16    Ht 5' 2\" (1.575 m)    Wt 125 lb (56.7 kg)    SpO2 99%    BMI 22.86 kg/m2       GEN: Appears stated age in NAD. HEAD:  Normocephalic, atraumatic. NEURO:  Sensation intact light touch B/L lower extremities. MS:  Strength normal throughout upper and lower extremities bilateral .   left ankle/foot:  Positive tenderness at prox 5th metatarsal but much improved. Anterior drawer test negative. Talar tilt negative. Kleiger test negative. Syndesmosis squeeze negative.   negative fibular head tenderness. Fibular head motion normal.  no clubbing/cyanosis. ROM decreased. + significant ecchymosis  EXT: no clubbing/cyanosis. no edema. SKIN: Warm/dry without rash. Assessment/Plan:     ICD-10-CM ICD-9-CM    1. Avulsion fracture of metatarsal bone, right, with routine healing, subsequent encounter S92.301D V54.19        Patient verbalizes understanding of evaluation and plan. Will continue wean boot and she will work on ROM and ice and RTC 2-3 weeks.

## 2017-03-21 NOTE — PATIENT INSTRUCTIONS
Fifth Metatarsal Fracture: Rehab Exercises  Your Care Instructions  Here are some examples of typical rehabilitation exercises for your condition. Start each exercise slowly. Ease off the exercise if you start to have pain. Your doctor or physical therapist will tell you when you can start these exercises and which ones will work best for you. How to do the exercises  Calf wall stretch (back knee straight)    1. Stand facing a wall with your hands on the wall at about eye level. Put your affected foot about a step behind your other foot. 2. Keeping your back leg straight and your back heel on the floor, bend your front knee and gently bring your hip and chest toward the wall until you feel a stretch in the calf of your back leg. 3. Hold the stretch for at least 15 to 30 seconds. 4. Repeat 2 to 4 times. Calf wall stretch (knees bent)    1. Stand facing a wall with your hands on the wall at about eye level. Put your affected foot about a step behind your other foot. 2. Keeping both heels on the floor, bend both knees. Then gently bring your hip and chest toward the wall until you feel a stretch in the calf of your back leg. 3. Hold the stretch for at least 15 to 30 seconds. 4. Repeat 2 to 4 times. [place] pick-ups    1. Put some marbles on the floor next to a cup.  2. Sit in a chair, and use the toes of your affected foot to lift up one marble from the floor at a time. Then try to put the marble in the cup.  3. Repeat 8 to 12 times. Towel scrunches    1. Sit in a chair, and place both feet on a towel on the floor. 2. Scrunch the towel toward you with your toes. Then use your toes to push the towel back into place. 3. Repeat 8 to 12 times. Towel inversion and eversion    1. Sit in a chair, and place both feet on a towel on the floor. 2. Swivel your feet from side to side to slide the towel. First slide your toes, then your heels, as you move the towel with your feet.  Then change directions and swivel your feet from side to side to slide the towel back to the starting position. 3. Repeat 8 to 12 times. Resisted ankle inversion    1. Sit on the floor with your good foot crossed over your affected foot. 2. Hold both ends of an exercise band, and loop the band around the inside of your affected foot. Then press your other foot against the band. 3. Keeping your feet crossed, slowly push your affected foot against the band so that foot moves away from your other foot. Then slowly relax. 4. Repeat 8 to 12 times. Resisted ankle eversion    1. Sit on the floor with your legs straight. 2. Hold both ends of an exercise band, and loop the band around the outside of your affected foot. Then press your other foot against the band. 3. Keeping your leg straight, slowly push your affected foot outward against the band and away from your other foot without letting your leg rotate. Then slowly relax. 4. Repeat 8 to 12 times. Follow-up care is a key part of your treatment and safety. Be sure to make and go to all appointments, and call your doctor if you are having problems. It's also a good idea to know your test results and keep a list of the medicines you take. Where can you learn more? Go to http://michel-dallin.info/. Enter [id number] [id number] in the search box to learn more about \"Fifth Metatarsal Fracture: Rehab Exercises. \"  Current as of: May 23, 2016  Content Version: 11.1  © 1757-1476 [employer]. Care instructions adapted under license by [employer] (which disclaims liability or warranty for this information). If you have questions about a medical condition or this instruction, always ask your healthcare professional. [address] any warranty or liability for your use of this information.

## 2017-03-21 NOTE — MR AVS SNAPSHOT
Visit Information Date & Time Provider Department Dept. Phone Encounter #  
 3/21/2017 11:00 AM Indigo Calvo, [facility] [phone] [id number] Follow-up Instructions Return in about 3 weeks (around 4/11/2017) for 5th metatarsal Fx left. Upcoming Health Maintenance Date Due Hepatitis C Screening 1960 DTaP/Tdap/Td series (1 - Tdap) 9/18/1979 BREAST CANCER SCRN MAMMOGRAM 9/18/2008 FOBT Q 1 YEAR AGE 50-75 9/18/2008 PAP AKA CERVICAL CYTOLOGY 7/28/2014 INFLUENZA AGE 9 TO ADULT 8/1/2016 Allergies as of 3/21/2017  Review Complete On: 3/21/2017 By: Indigo Calvo DO Severity Noted Reaction Type Reactions Celebrex [Celecoxib]  06/20/2016    Diarrhea Current Immunizations  Never Reviewed No immunizations on file. Not reviewed this visit You Were Diagnosed With   
  
 Codes Comments Avulsion fracture of metatarsal bone, right, with routine healing, subsequent encounter    -  Primary ICD-10-CM: S92.301D ICD-9-CM: V54.19 Vitals BP Pulse Temp Resp Height(growth percentile) Weight(growth percentile) (!) 153/91 (BP 1 Location: Right arm, BP Patient Position: Sitting) (!) 101 97 °F (36.1 °C) (Oral) 16 5' 2\" (1.575 m) 125 lb (56.7 kg) SpO2 BMI OB Status Smoking Status 99% 22.86 kg/m2 Hysterectomy Former Smoker Vitals History BMI and BSA Data Body Mass Index Body Surface Area  
 22.86 kg/m 2 1.57 m 2 Preferred Pharmacy Pharmacy Name Phone [facility] [address] [facility], [address] [place] [address] [phone] Your Updated Medication List  
  
   
This list is accurate as of: 3/21/17 11:32 AM.  Always use your most recent med list.  
  
  
  
  
 * ASACOL  mg DR tablet Generic drug:  mesalamine DR Take 800 mg by mouth nightly. Indications: ULCERATIVE COLITIS  
  
 * mesalamine  mg DR tablet Commonly known as:  ASACOL HD Take 1,600 mg by mouth two (2) times a day. 1600 MG AM, 800 MG PM  Indications: ULCERATIVE COLITIS buPROPion  mg SR tablet Commonly known as:  WELLBUTRIN SR  
150 mg two (2) times a day. Indications: depression  
  
 cetirizine 10 mg tablet Commonly known as:  ZYRTEC Take 10 mg by mouth nightly. Indications: ALLERGIC RHINITIS Desvenlafaxine 100 mg Tb24 Take 100 mg by mouth daily. Indications: depression * fluticasone 110 mcg/actuation inhaler Commonly known as:  FLOVENT HFA Take 2 Puffs by inhalation every twelve (12) hours. Indications: asthma * fluticasone 50 mcg/actuation nasal spray Commonly known as:  Caffie Euler 2 Sprays by Both Nostrils route nightly. Indications: ALLERGIC RHINITIS, can take AM dose if feels like she needs it  
  
 glucagon 1 mg injection Commonly known as:  GLUCAGEN  
1 mg as needed. Indications: emergency treatment for if hypoglycemic HYDROcodone-acetaminophen 5-325 mg per tablet Commonly known as:  Tucker Punt Take 1 Tab by mouth every eight (8) hours as needed for Pain. Max Daily Amount: 3 Tabs. insulin lispro 100 unit/mL injection Commonly known as:  HUMALOG  
by SubCUTAneous route. Via insulin pump basal - (midnight- 09:30am = .80mg/hr)  (09:30am - midnight -.65mg/hr) LIPITOR 20 mg tablet Generic drug:  atorvastatin Take 30 mg by mouth nightly. Indications: HYPERCHOLESTEROLEMIA LORazepam 0.5 mg tablet Commonly known as:  ATIVAN Take 0.5 mg by mouth every four (4) hours as needed for Anxiety. multivitamin tablet Commonly known as:  ONE A DAY Take 1 Tab by mouth daily. VENTOLIN HFA 90 mcg/actuation inhaler Generic drug:  albuterol 2 Puffs every four (4) hours as needed. Indications: ACUTE ASTHMA ATTACK  
  
 VITAMIN D3 2,000 unit Cap capsule Generic drug:  Cholecalciferol (Vitamin D3) Take  by Mouth Once a Day. * Notice: This list has 4 medication(s) that are the same as other medications prescribed for you. Read the directions carefully, and ask your doctor or other care provider to review them with you. Follow-up Instructions Return in about 3 weeks (around 4/11/2017) for 5th metatarsal Fx left. Patient Instructions Fifth Metatarsal Fracture: Rehab Exercises Your Care Instructions Here are some examples of typical rehabilitation exercises for your condition. Start each exercise slowly. Ease off the exercise if you start to have pain. Your doctor or physical therapist will tell you when you can start these exercises and which ones will work best for you. How to do the exercises Calf wall stretch (back knee straight) 1. Stand facing a wall with your hands on the wall at about eye level. Put your affected foot about a step behind your other foot. 2. Keeping your back leg straight and your back heel on the floor, bend your front knee and gently bring your hip and chest toward the wall until you feel a stretch in the calf of your back leg. 3. Hold the stretch for at least 15 to 30 seconds. 4. Repeat 2 to 4 times. Calf wall stretch (knees bent) 1. Stand facing a wall with your hands on the wall at about eye level. Put your affected foot about a step behind your other foot. 2. Keeping both heels on the floor, bend both knees. Then gently bring your hip and chest toward the wall until you feel a stretch in the calf of your back leg. 3. Hold the stretch for at least 15 to 30 seconds. 4. Repeat 2 to 4 times. [place] pick-ups 1. Put some marbles on the floor next to a cup. 
2. Sit in a chair, and use the toes of your affected foot to lift up one marble from the floor at a time. Then try to put the marble in the cup. 
3. Repeat 8 to 12 times. Towel scrunches 1. Sit in a chair, and place both feet on a towel on the floor. 2. Scrunch the towel toward you with your toes. Then use your toes to push the towel back into place. 3. Repeat 8 to 12 times. Towel inversion and eversion 1. Sit in a chair, and place both feet on a towel on the floor. 2. Swivel your feet from side to side to slide the towel. First slide your toes, then your heels, as you move the towel with your feet. Then change directions and swivel your feet from side to side to slide the towel back to the starting position. 3. Repeat 8 to 12 times. Resisted ankle inversion 1. Sit on the floor with your good foot crossed over your affected foot. 2. Hold both ends of an exercise band, and loop the band around the inside of your affected foot. Then press your other foot against the band. 3. Keeping your feet crossed, slowly push your affected foot against the band so that foot moves away from your other foot. Then slowly relax. 4. Repeat 8 to 12 times. Resisted ankle eversion 1. Sit on the floor with your legs straight. 2. Hold both ends of an exercise band, and loop the band around the outside of your affected foot. Then press your other foot against the band. 3. Keeping your leg straight, slowly push your affected foot outward against the band and away from your other foot without letting your leg rotate. Then slowly relax. 4. Repeat 8 to 12 times. Follow-up care is a key part of your treatment and safety. Be sure to make and go to all appointments, and call your doctor if you are having problems. It's also a good idea to know your test results and keep a list of the medicines you take. Where can you learn more? Go to http://michel-dallin.info/. Enter [phone] in the search box to learn more about \"Fifth Metatarsal Fracture: Rehab Exercises. \" Current as of: May 23, 2016 Content Version: 11.1 © 6286-9422 [employer].  Care instructions adapted under license by [employer] (which disclaims liability or warranty for this information). If you have questions about a medical condition or this instruction, always ask your healthcare professional. [address] any warranty or liability for your use of this information. Introducing [facility] & HEALTH SERVICES! Dear Clifton Jung: Thank you for requesting a AssetAvenue account. Our records indicate that you already have an active AssetAvenue account. You can access your account anytime at [email or web address] Did you know that you can access your hospital and ER discharge instructions at any time in AssetAvenue? You can also review all of your test results from your hospital stay or ER visit. Additional Information If you have questions, please visit the Frequently Asked Questions section of the AssetAvenue website at [email or web address] Remember, AssetAvenue is NOT to be used for urgent needs. For medical emergencies, dial 911. Now available from your iPhone and Android! Please provide this summary of care documentation to your next provider. Your primary care clinician is listed as Sylvia Edwards. If you have any questions after today's visit, please call [phone].

## 2017-04-04 ENCOUNTER — OFFICE VISIT (OUTPATIENT)
Dept: ORTHOPEDIC SURGERY | Age: 57
End: 2017-04-04

## 2017-04-04 VITALS
WEIGHT: 122 LBS | RESPIRATION RATE: 15 BRPM | SYSTOLIC BLOOD PRESSURE: 137 MMHG | TEMPERATURE: 98.4 F | OXYGEN SATURATION: 95 % | HEIGHT: 62 IN | DIASTOLIC BLOOD PRESSURE: 86 MMHG | HEART RATE: 98 BPM | BODY MASS INDEX: 22.45 KG/M2

## 2017-04-04 DIAGNOSIS — S92.302A: Primary | ICD-10-CM

## 2017-04-04 NOTE — PATIENT INSTRUCTIONS
Fifth Metatarsal Fracture: Rehab Exercises  Your Care Instructions  Here are some examples of typical rehabilitation exercises for your condition. Start each exercise slowly. Ease off the exercise if you start to have pain. Your doctor or physical therapist will tell you when you can start these exercises and which ones will work best for you. How to do the exercises  Calf wall stretch (back knee straight)    1. Stand facing a wall with your hands on the wall at about eye level. Put your affected foot about a step behind your other foot. 2. Keeping your back leg straight and your back heel on the floor, bend your front knee and gently bring your hip and chest toward the wall until you feel a stretch in the calf of your back leg. 3. Hold the stretch for at least 15 to 30 seconds. 4. Repeat 2 to 4 times. Calf wall stretch (knees bent)    1. Stand facing a wall with your hands on the wall at about eye level. Put your affected foot about a step behind your other foot. 2. Keeping both heels on the floor, bend both knees. Then gently bring your hip and chest toward the wall until you feel a stretch in the calf of your back leg. 3. Hold the stretch for at least 15 to 30 seconds. 4. Repeat 2 to 4 times. [place] pick-ups    1. Put some marbles on the floor next to a cup.  2. Sit in a chair, and use the toes of your affected foot to lift up one marble from the floor at a time. Then try to put the marble in the cup.  3. Repeat 8 to 12 times. Towel scrunches    1. Sit in a chair, and place both feet on a towel on the floor. 2. Scrunch the towel toward you with your toes. Then use your toes to push the towel back into place. 3. Repeat 8 to 12 times. Towel inversion and eversion    1. Sit in a chair, and place both feet on a towel on the floor. 2. Swivel your feet from side to side to slide the towel. First slide your toes, then your heels, as you move the towel with your feet.  Then change directions and swivel your feet from side to side to slide the towel back to the starting position. 3. Repeat 8 to 12 times. Resisted ankle inversion    1. Sit on the floor with your good foot crossed over your affected foot. 2. Hold both ends of an exercise band, and loop the band around the inside of your affected foot. Then press your other foot against the band. 3. Keeping your feet crossed, slowly push your affected foot against the band so that foot moves away from your other foot. Then slowly relax. 4. Repeat 8 to 12 times. Resisted ankle eversion    1. Sit on the floor with your legs straight. 2. Hold both ends of an exercise band, and loop the band around the outside of your affected foot. Then press your other foot against the band. 3. Keeping your leg straight, slowly push your affected foot outward against the band and away from your other foot without letting your leg rotate. Then slowly relax. 4. Repeat 8 to 12 times. Follow-up care is a key part of your treatment and safety. Be sure to make and go to all appointments, and call your doctor if you are having problems. It's also a good idea to know your test results and keep a list of the medicines you take. Where can you learn more? Go to http://michel-dallin.info/. Enter [phone] in the search box to learn more about \"Fifth Metatarsal Fracture: Rehab Exercises. \"  Current as of: May 23, 2016  Content Version: 11.2  © 7190-3037 [employer]. Care instructions adapted under license by [employer] (which disclaims liability or warranty for this information). If you have questions about a medical condition or this instruction, always ask your healthcare professional. [address] any warranty or liability for your use of this information.

## 2017-04-04 NOTE — MR AVS SNAPSHOT
Visit Information Date & Time Provider Department Dept. Phone Encounter #  
 4/4/2017 11:20 AM Chapito Jordan, [address] [facility] [phone] [id number] Follow-up Instructions Return in about 3 weeks (around 4/25/2017) for last foot Fx. Upcoming Health Maintenance Date Due Hepatitis C Screening 1960 DTaP/Tdap/Td series (1 - Tdap) 9/18/1979 BREAST CANCER SCRN MAMMOGRAM 9/18/2008 FOBT Q 1 YEAR AGE 50-75 9/18/2008 PAP AKA CERVICAL CYTOLOGY 7/28/2014 INFLUENZA AGE 9 TO ADULT 8/1/2016 Allergies as of 4/4/2017  Review Complete On: 4/4/2017 By: Chapito Jordan DO Severity Noted Reaction Type Reactions Celebrex [Celecoxib]  06/20/2016    Diarrhea Nsaids (Non-steroidal Anti-inflammatory Drug)  04/04/2017    Other (comments) Current Immunizations  Never Reviewed No immunizations on file. Not reviewed this visit You Were Diagnosed With   
  
 Codes Comments Avulsion fracture of metatarsal bone, left, closed, initial encounter    -  Primary ICD-10-CM: R16.032W ICD-9-CM: 825.25 Vitals BP Pulse Temp Resp Height(growth percentile) Weight(growth percentile) 137/86 (BP 1 Location: Left arm, BP Patient Position: Sitting) 98 98.4 °F (36.9 °C) (Oral) 15 5' 2\" (1.575 m) 122 lb (55.3 kg) SpO2 BMI OB Status Smoking Status 95% 22.31 kg/m2 Hysterectomy Former Smoker BMI and BSA Data Body Mass Index Body Surface Area  
 22.31 kg/m 2 1.56 m 2 Preferred Pharmacy Pharmacy Name Phone [facility] [address] [facility], [address] [place] [address] [phone] Your Updated Medication List  
  
   
This list is accurate as of: 4/4/17 11:33 AM.  Always use your most recent med list.  
  
  
  
  
 * ASACOL  mg DR tablet Generic drug:  mesalamine DR Take 800 mg by mouth nightly. Indications: ULCERATIVE COLITIS * mesalamine  mg DR tablet Commonly known as:  ASACOL HD Take 1,600 mg by mouth two (2) times a day. 1600 MG AM, 800 MG PM  Indications: ULCERATIVE COLITIS buPROPion  mg SR tablet Commonly known as:  WELLBUTRIN SR  
150 mg two (2) times a day. Indications: depression  
  
 cetirizine 10 mg tablet Commonly known as:  ZYRTEC Take 10 mg by mouth nightly. Indications: ALLERGIC RHINITIS Desvenlafaxine 100 mg Tb24 Take 100 mg by mouth daily. Indications: depression * fluticasone 110 mcg/actuation inhaler Commonly known as:  FLOVENT HFA Take 2 Puffs by inhalation every twelve (12) hours. Indications: asthma * fluticasone 50 mcg/actuation nasal spray Commonly known as:  Dori Hou 2 Sprays by Both Nostrils route nightly. Indications: ALLERGIC RHINITIS, can take AM dose if feels like she needs it  
  
 glucagon 1 mg injection Commonly known as:  GLUCAGEN  
1 mg as needed. Indications: emergency treatment for if hypoglycemic HYDROcodone-acetaminophen 5-325 mg per tablet Commonly known as:  Lima Fryer Take 1 Tab by mouth every eight (8) hours as needed for Pain. Max Daily Amount: 3 Tabs. insulin lispro 100 unit/mL injection Commonly known as:  HUMALOG  
by SubCUTAneous route. Via insulin pump basal - (midnight- 09:30am = .80mg/hr)  (09:30am - midnight -.65mg/hr) LIPITOR 20 mg tablet Generic drug:  atorvastatin Take 30 mg by mouth nightly. Indications: HYPERCHOLESTEROLEMIA LORazepam 0.5 mg tablet Commonly known as:  ATIVAN Take 0.5 mg by mouth every four (4) hours as needed for Anxiety. multivitamin tablet Commonly known as:  ONE A DAY Take 1 Tab by mouth daily. VENTOLIN HFA 90 mcg/actuation inhaler Generic drug:  albuterol 2 Puffs every four (4) hours as needed. Indications: ACUTE ASTHMA ATTACK  
  
 VITAMIN D3 2,000 unit Cap capsule Generic drug:  Cholecalciferol (Vitamin D3) Take  by Mouth Once a Day. * Notice: This list has 4 medication(s) that are the same as other medications prescribed for you. Read the directions carefully, and ask your doctor or other care provider to review them with you. Follow-up Instructions Return in about 3 weeks (around 4/25/2017) for last foot Fx. Patient Instructions Fifth Metatarsal Fracture: Rehab Exercises Your Care Instructions Here are some examples of typical rehabilitation exercises for your condition. Start each exercise slowly. Ease off the exercise if you start to have pain. Your doctor or physical therapist will tell you when you can start these exercises and which ones will work best for you. How to do the exercises Calf wall stretch (back knee straight) 1. Stand facing a wall with your hands on the wall at about eye level. Put your affected foot about a step behind your other foot. 2. Keeping your back leg straight and your back heel on the floor, bend your front knee and gently bring your hip and chest toward the wall until you feel a stretch in the calf of your back leg. 3. Hold the stretch for at least 15 to 30 seconds. 4. Repeat 2 to 4 times. Calf wall stretch (knees bent) 1. Stand facing a wall with your hands on the wall at about eye level. Put your affected foot about a step behind your other foot. 2. Keeping both heels on the floor, bend both knees. Then gently bring your hip and chest toward the wall until you feel a stretch in the calf of your back leg. 3. Hold the stretch for at least 15 to 30 seconds. 4. Repeat 2 to 4 times. [place] pick-ups 1. Put some marbles on the floor next to a cup. 
2. Sit in a chair, and use the toes of your affected foot to lift up one marble from the floor at a time. Then try to put the marble in the cup. 
3. Repeat 8 to 12 times. Towel scrunches 1. Sit in a chair, and place both feet on a towel on the floor. 2. Scrunch the towel toward you with your toes. Then use your toes to push the towel back into place. 3. Repeat 8 to 12 times. Towel inversion and eversion 1. Sit in a chair, and place both feet on a towel on the floor. 2. Swivel your feet from side to side to slide the towel. First slide your toes, then your heels, as you move the towel with your feet. Then change directions and swivel your feet from side to side to slide the towel back to the starting position. 3. Repeat 8 to 12 times. Resisted ankle inversion 1. Sit on the floor with your good foot crossed over your affected foot. 2. Hold both ends of an exercise band, and loop the band around the inside of your affected foot. Then press your other foot against the band. 3. Keeping your feet crossed, slowly push your affected foot against the band so that foot moves away from your other foot. Then slowly relax. 4. Repeat 8 to 12 times. Resisted ankle eversion 1. Sit on the floor with your legs straight. 2. Hold both ends of an exercise band, and loop the band around the outside of your affected foot. Then press your other foot against the band. 3. Keeping your leg straight, slowly push your affected foot outward against the band and away from your other foot without letting your leg rotate. Then slowly relax. 4. Repeat 8 to 12 times. Follow-up care is a key part of your treatment and safety. Be sure to make and go to all appointments, and call your doctor if you are having problems. It's also a good idea to know your test results and keep a list of the medicines you take. Where can you learn more? Go to http://michel-dallin.info/. Enter [phone] in the search box to learn more about \"Fifth Metatarsal Fracture: Rehab Exercises. \" Current as of: May 23, 2016 Content Version: 11.2 © 8136-2873 [employer].  Care instructions adapted under license by [employer] (which disclaims liability or warranty for this information). If you have questions about a medical condition or this instruction, always ask your healthcare professional. [address] any warranty or liability for your use of this information. Introducing [facility] & HEALTH SERVICES! Dear Prasad Stringer: Thank you for requesting a CircleUp account. Our records indicate that you already have an active CircleUp account. You can access your account anytime at [email or web address] Did you know that you can access your hospital and ER discharge instructions at any time in CircleUp? You can also review all of your test results from your hospital stay or ER visit. Additional Information If you have questions, please visit the Frequently Asked Questions section of the CircleUp website at [email or web address] Remember, CircleUp is NOT to be used for urgent needs. For medical emergencies, dial 911. Now available from your iPhone and Android! Please provide this summary of care documentation to your next provider. Your primary care clinician is listed as Yaneth Christianson. If you have any questions after today's visit, please call [phone].

## 2017-04-04 NOTE — PROGRESS NOTES
HISTORY OF PRESENT ILLNESS    Ivan Torres is a 60y.o. year old female comes in today to be evaluated and treated for: left foot pain    Since last appt pain much bettr and able to walk at home w/o the boot but does admit to feeling weak. Some swell but improved. Not using norco.    Social History     Social History    Marital status:      Spouse name: N/A    Number of children: N/A    Years of education: N/A     Occupational History    dance teacher      Social History Main Topics    Smoking status: Former Smoker    Smokeless tobacco: Never Used    Alcohol use 0.6 oz/week     1 Glasses of wine per week    Drug use: No    Sexual activity: Yes     Partners: Male     Birth control/ protection: None     Other Topics Concern    Not on file     Social History Narrative     Current Outpatient Prescriptions   Medication Sig Dispense Refill    HYDROcodone-acetaminophen (NORCO) 5-325 mg per tablet Take 1 Tab by mouth every eight (8) hours as needed for Pain. Max Daily Amount: 3 Tabs. 45 Tab 0    mesalamine DR (ASACOL HD) 800 mg DR tablet Take 1,600 mg by mouth two (2) times a day. 1600 MG AM, 800 MG PM  Indications: ULCERATIVE COLITIS      mesalamine DR (ASACOL HD) 800 mg DR tablet Take 800 mg by mouth nightly. Indications: ULCERATIVE COLITIS      multivitamin (ONE A DAY) tablet Take 1 Tab by mouth daily.  insulin lispro (HUMALOG) 100 unit/mL injection by SubCUTAneous route. Via insulin pump  basal - (midnight- 09:30am = .80mg/hr)   (09:30am - midnight -.65mg/hr)      LORazepam (ATIVAN) 0.5 mg tablet Take 0.5 mg by mouth every four (4) hours as needed for Anxiety.  Desvenlafaxine 100 mg Tb24 Take 100 mg by mouth daily. Indications: depression      fluticasone (FLONASE) 50 mcg/actuation nasal spray 2 Sprays by Both Nostrils route nightly.  Indications: ALLERGIC RHINITIS, can take AM dose if feels like she needs it      albuterol (VENTOLIN HFA) 90 mcg/actuation inhaler 2 Puffs every four (4) hours as needed. Indications: ACUTE ASTHMA ATTACK      atorvastatin (LIPITOR) 20 mg tablet Take 30 mg by mouth nightly. Indications: HYPERCHOLESTEROLEMIA      buPROPion SR (WELLBUTRIN SR) 150 mg SR tablet 150 mg two (2) times a day. Indications: depression      cetirizine (ZYRTEC) 10 mg tablet Take 10 mg by mouth nightly. Indications: ALLERGIC RHINITIS      Cholecalciferol, Vitamin D3, (VITAMIN D3) 2,000 unit cap capsule Take  by Mouth Once a Day.  fluticasone (FLOVENT HFA) 110 mcg/actuation inhaler Take 2 Puffs by inhalation every twelve (12) hours. Indications: asthma      glucagon (GLUCAGEN) 1 mg injection 1 mg as needed. Indications: emergency treatment for if hypoglycemic       Past Medical History:   Diagnosis Date    Arthritis     Asthma     Autoimmune disease ([facility] [address]     colitis    Bronchitis     Contact dermatitis and other eczema, due to unspecified cause     Depression     [facility] 2012    Hypercholesterolemia     Osteoarthritis of left hip 6/14/2016    Osteoarthritis of right hip 11/4/2016     Family History   Problem Relation Age of Onset    Cancer Mother     Elevated Lipids Mother     Cancer Father     Heart Disease Father     Hypertension Father     Elevated Lipids Father      ROS:  See HPI. Objective:  Visit Vitals    /86 (BP 1 Location: Left arm, BP Patient Position: Sitting)    Pulse 98    Temp 98.4 °F (36.9 °C) (Oral)    Resp 15    Ht 5' 2\" (1.575 m)    Wt 122 lb (55.3 kg)    SpO2 95%    BMI 22.31 kg/m2       GEN: Appears stated age in NAD. HEAD:  Normocephalic, atraumatic. NEURO:  Sensation intact light touch B/L lower extremities. MS:  Strength normal throughout upper and lower extremities bilateral .   left ankle/foot:  Positive tenderness at base 5th metatarsal.  Anterior drawer test negative. Talar tilt negative. Kleiger test negative. Syndesmosis squeeze negative. negative fibular head tenderness.   Fibular head motion normal. Gait normal. no clubbing/cyanosis. ROM normal.  EXT: no clubbing/cyanosis. no edema. SKIN: Warm/dry without rash. Assessment/Plan:     ICD-10-CM ICD-9-CM    1. Avulsion fracture of metatarsal bone, left, closed, initial encounter S92.302A 825.25        Patient verbalizes understanding of evaluation and plan. Continue boot as pain still present and will slowly work ROM and wean boot when pain free and RTC 2-3 weeks. Time with Pt 28 minutes, >50% of which was counseling pt regarding Dx and Tx options and coordination of care.

## 2017-04-25 ENCOUNTER — OFFICE VISIT (OUTPATIENT)
Dept: ORTHOPEDIC SURGERY | Age: 57
End: 2017-04-25

## 2017-04-25 VITALS
SYSTOLIC BLOOD PRESSURE: 123 MMHG | HEIGHT: 62 IN | OXYGEN SATURATION: 98 % | TEMPERATURE: 98 F | HEART RATE: 89 BPM | WEIGHT: 121 LBS | DIASTOLIC BLOOD PRESSURE: 83 MMHG | BODY MASS INDEX: 22.26 KG/M2 | RESPIRATION RATE: 16 BRPM

## 2017-04-25 DIAGNOSIS — S92.355D CLOSED NONDISPLACED FRACTURE OF FIFTH METATARSAL BONE OF LEFT FOOT WITH ROUTINE HEALING, SUBSEQUENT ENCOUNTER: Primary | ICD-10-CM

## 2017-04-25 NOTE — PROGRESS NOTES
HISTORY OF PRESENT ILLNESS    Loretta Flanagan is a 60y.o. year old female comes in today to be evaluated and treated for: left foot fracture    Since last appt had done great with boot which she weaned off about a week ago and no more pain. Social History     Social History    Marital status:      Spouse name: N/A    Number of children: N/A    Years of education: N/A     Occupational History    dance teacher      Social History Main Topics    Smoking status: Former Smoker    Smokeless tobacco: Never Used    Alcohol use 0.6 oz/week     1 Glasses of wine per week    Drug use: No    Sexual activity: Yes     Partners: Male     Birth control/ protection: None     Other Topics Concern    None     Social History Narrative     Current Outpatient Prescriptions   Medication Sig Dispense Refill    mesalamine DR (ASACOL HD) 800 mg DR tablet Take 1,600 mg by mouth two (2) times a day. 1600 MG AM, 800 MG PM  Indications: ULCERATIVE COLITIS      mesalamine DR (ASACOL HD) 800 mg DR tablet Take 800 mg by mouth nightly. Indications: ULCERATIVE COLITIS      multivitamin (ONE A DAY) tablet Take 1 Tab by mouth daily.  insulin lispro (HUMALOG) 100 unit/mL injection by SubCUTAneous route. Via insulin pump  basal - (midnight- 09:30am = .80mg/hr)   (09:30am - midnight -.65mg/hr)      LORazepam (ATIVAN) 0.5 mg tablet Take 0.5 mg by mouth every four (4) hours as needed for Anxiety.  Desvenlafaxine 100 mg Tb24 Take 100 mg by mouth daily. Indications: depression      fluticasone (FLONASE) 50 mcg/actuation nasal spray 2 Sprays by Both Nostrils route nightly. Indications: ALLERGIC RHINITIS, can take AM dose if feels like she needs it      albuterol (VENTOLIN HFA) 90 mcg/actuation inhaler 2 Puffs every four (4) hours as needed. Indications: ACUTE ASTHMA ATTACK      atorvastatin (LIPITOR) 20 mg tablet Take 30 mg by mouth nightly.  Indications: HYPERCHOLESTEROLEMIA      buPROPion SR (WELLBUTRIN SR) 150 mg SR tablet 150 mg two (2) times a day. Indications: depression      cetirizine (ZYRTEC) 10 mg tablet Take 10 mg by mouth nightly. Indications: ALLERGIC RHINITIS      Cholecalciferol, Vitamin D3, (VITAMIN D3) 2,000 unit cap capsule Take  by Mouth Once a Day.  fluticasone (FLOVENT HFA) 110 mcg/actuation inhaler Take 2 Puffs by inhalation every twelve (12) hours. Indications: asthma      glucagon (GLUCAGEN) 1 mg injection 1 mg as needed. Indications: emergency treatment for if hypoglycemic      HYDROcodone-acetaminophen (NORCO) 5-325 mg per tablet Take 1 Tab by mouth every eight (8) hours as needed for Pain. Max Daily Amount: 3 Tabs. 39 Tab 0     Past Medical History:   Diagnosis Date    Arthritis     Asthma     Autoimmune disease ([facility]     colitis    Bronchitis     Contact dermatitis and other eczema, due to unspecified cause     Depression     Diabetes ([facility] 2012    Hypercholesterolemia     Osteoarthritis of left hip 6/14/2016    Osteoarthritis of right hip 11/4/2016     Family History   Problem Relation Age of Onset    Cancer Mother     Elevated Lipids Mother     Cancer Father     Heart Disease Father     Hypertension Father     Elevated Lipids Father          ROS:  No swell, bruise    Objective:  Visit Vitals    /83 (BP 1 Location: Right arm, BP Patient Position: Sitting)    Pulse 89    Temp 98 °F (36.7 °C) (Oral)    Resp 16    Ht 5' 2\" (1.575 m)    Wt 121 lb (54.9 kg)    SpO2 98%    BMI 22.13 kg/m2     GEN: Appears stated age in NAD. HEAD: Normocephalic, atraumatic. NEURO: Sensation intact light touch B/L lower extremities. MS: Strength normal throughout upper and lower extremities bilateral .   left ankle/foot: Positive tenderness at base 5th metatarsal but minimal. Anterior drawer test negative. Talar tilt negative. Kleiger test negative. Syndesmosis squeeze negative. negative fibular head tenderness. Fibular head motion normal. Gait normal. no clubbing/cyanosis.  ROM normal.  EXT: no clubbing/cyanosis. no edema. SKIN: Warm/dry without rash. Assessment/Plan:     ICD-10-CM ICD-9-CM    1. Closed nondisplaced fracture of fifth metatarsal bone of left foot with routine healing, subsequent encounter S92.355D V54.19        Patient verbalizes understanding of evaluation and plan. Albert continue current and HEP as demo and RTC as needed.

## 2017-04-25 NOTE — PATIENT INSTRUCTIONS
Fifth Metatarsal Fracture: Rehab Exercises  Your Care Instructions  Here are some examples of typical rehabilitation exercises for your condition. Start each exercise slowly. Ease off the exercise if you start to have pain. Your doctor or physical therapist will tell you when you can start these exercises and which ones will work best for you. How to do the exercises  Calf wall stretch (back knee straight)    1. Stand facing a wall with your hands on the wall at about eye level. Put your affected foot about a step behind your other foot. 2. Keeping your back leg straight and your back heel on the floor, bend your front knee and gently bring your hip and chest toward the wall until you feel a stretch in the calf of your back leg. 3. Hold the stretch for at least 15 to 30 seconds. 4. Repeat 2 to 4 times. Calf wall stretch (knees bent)    1. Stand facing a wall with your hands on the wall at about eye level. Put your affected foot about a step behind your other foot. 2. Keeping both heels on the floor, bend both knees. Then gently bring your hip and chest toward the wall until you feel a stretch in the calf of your back leg. 3. Hold the stretch for at least 15 to 30 seconds. 4. Repeat 2 to 4 times. [place] pick-ups    1. Put some marbles on the floor next to a cup.  2. Sit in a chair, and use the toes of your affected foot to lift up one marble from the floor at a time. Then try to put the marble in the cup.  3. Repeat 8 to 12 times. Towel scrunches    1. Sit in a chair, and place both feet on a towel on the floor. 2. Scrunch the towel toward you with your toes. Then use your toes to push the towel back into place. 3. Repeat 8 to 12 times. Towel inversion and eversion    1. Sit in a chair, and place both feet on a towel on the floor. 2. Swivel your feet from side to side to slide the towel. First slide your toes, then your heels, as you move the towel with your feet.  Then change directions and swivel your feet from side to side to slide the towel back to the starting position. 3. Repeat 8 to 12 times. Resisted ankle inversion    1. Sit on the floor with your good foot crossed over your affected foot. 2. Hold both ends of an exercise band, and loop the band around the inside of your affected foot. Then press your other foot against the band. 3. Keeping your feet crossed, slowly push your affected foot against the band so that foot moves away from your other foot. Then slowly relax. 4. Repeat 8 to 12 times. Resisted ankle eversion    1. Sit on the floor with your legs straight. 2. Hold both ends of an exercise band, and loop the band around the outside of your affected foot. Then press your other foot against the band. 3. Keeping your leg straight, slowly push your affected foot outward against the band and away from your other foot without letting your leg rotate. Then slowly relax. 4. Repeat 8 to 12 times. Follow-up care is a key part of your treatment and safety. Be sure to make and go to all appointments, and call your doctor if you are having problems. It's also a good idea to know your test results and keep a list of the medicines you take. Where can you learn more? Go to http://michel-dallin.info/. Enter 016 [id number] in the search box to learn more about \"Fifth Metatarsal Fracture: Rehab Exercises. \"  Current as of: May 23, 2016  Content Version: 11.2  © 5744-4115 [employer]. Care instructions adapted under license by [employer] (which disclaims liability or warranty for this information). If you have questions about a medical condition or this instruction, always ask your healthcare professional. [address] any warranty or liability for your use of this information.

## 2017-04-25 NOTE — MR AVS SNAPSHOT
Visit Information Date & Time Provider Department Dept. Phone Encounter #  
 4/25/2017  8:20 AM Galdino Macias, [facility] [phone] [id number] Follow-up Instructions Return if symptoms worsen or fail to improve. Follow-up and Disposition History Upcoming Health Maintenance Date Due Hepatitis C Screening 1960 DTaP/Tdap/Td series (1 - Tdap) 9/18/1979 BREAST CANCER SCRN MAMMOGRAM 9/18/2008 FOBT Q 1 YEAR AGE 50-75 9/18/2008 PAP AKA CERVICAL CYTOLOGY 7/28/2014 INFLUENZA AGE 9 TO ADULT 8/1/2016 Allergies as of 4/25/2017  Review Complete On: 4/25/2017 By: Galdino Macias DO Severity Noted Reaction Type Reactions Celebrex [Celecoxib]  06/20/2016    Diarrhea Nsaids (Non-steroidal Anti-inflammatory Drug)  04/04/2017    Other (comments) Current Immunizations  Never Reviewed No immunizations on file. Not reviewed this visit You Were Diagnosed With   
  
 Codes Comments Closed nondisplaced fracture of fifth metatarsal bone of left foot with routine healing, subsequent encounter    -  Primary ICD-10-CM: L55.214Y ICD-9-CM: V54.19 Vitals BP Pulse Temp Resp Height(growth percentile) Weight(growth percentile) 123/83 (BP 1 Location: Right arm, BP Patient Position: Sitting) 89 98 °F (36.7 °C) (Oral) 16 5' 2\" (1.575 m) 121 lb (54.9 kg) SpO2 BMI OB Status Smoking Status 98% 22.13 kg/m2 Hysterectomy Former Smoker Vitals History BMI and BSA Data Body Mass Index Body Surface Area  
 22.13 kg/m 2 1.55 m 2 Preferred Pharmacy Pharmacy Name Phone [facility] [address] [facility], [address] [place] [address] [phone] Your Updated Medication List  
  
   
This list is accurate as of: 4/25/17  8:36 AM.  Always use your most recent med list.  
  
  
  
  
 * ASACOL  mg DR tablet Generic drug:  mesalamine   
 Take 800 mg by mouth nightly. Indications: ULCERATIVE COLITIS  
  
 * mesalamine  mg DR tablet Commonly known as:  ASACOL HD Take 1,600 mg by mouth two (2) times a day. 1600 MG AM, 800 MG PM  Indications: ULCERATIVE COLITIS buPROPion  mg SR tablet Commonly known as:  WELLBUTRIN SR  
150 mg two (2) times a day. Indications: depression  
  
 cetirizine 10 mg tablet Commonly known as:  ZYRTEC Take 10 mg by mouth nightly. Indications: ALLERGIC RHINITIS Desvenlafaxine 100 mg Tb24 Take 100 mg by mouth daily. Indications: depression * fluticasone 110 mcg/actuation inhaler Commonly known as:  FLOVENT HFA Take 2 Puffs by inhalation every twelve (12) hours. Indications: asthma * fluticasone 50 mcg/actuation nasal spray Commonly known as:  Li Najjar 2 Sprays by Both Nostrils route nightly. Indications: ALLERGIC RHINITIS, can take AM dose if feels like she needs it  
  
 glucagon 1 mg injection Commonly known as:  GLUCAGEN  
1 mg as needed. Indications: emergency treatment for if hypoglycemic HYDROcodone-acetaminophen 5-325 mg per tablet Commonly known as:  [place] Take 1 Tab by mouth every eight (8) hours as needed for Pain. Max Daily Amount: 3 Tabs. insulin lispro 100 unit/mL injection Commonly known as:  HUMALOG  
by SubCUTAneous route. Via insulin pump basal - (midnight- 09:30am = .80mg/hr)  (09:30am - midnight -.65mg/hr) LIPITOR 20 mg tablet Generic drug:  atorvastatin Take 30 mg by mouth nightly. Indications: HYPERCHOLESTEROLEMIA LORazepam 0.5 mg tablet Commonly known as:  ATIVAN Take 0.5 mg by mouth every four (4) hours as needed for Anxiety. multivitamin tablet Commonly known as:  ONE A DAY Take 1 Tab by mouth daily. VENTOLIN HFA 90 mcg/actuation inhaler Generic drug:  albuterol 2 Puffs every four (4) hours as needed. Indications: ACUTE ASTHMA ATTACK  
  
 VITAMIN D3 2,000 unit Cap capsule Generic drug:  Cholecalciferol (Vitamin D3) Take  by Mouth Once a Day. * Notice: This list has 4 medication(s) that are the same as other medications prescribed for you. Read the directions carefully, and ask your doctor or other care provider to review them with you. Follow-up Instructions Return if symptoms worsen or fail to improve. Patient Instructions Fifth Metatarsal Fracture: Rehab Exercises Your Care Instructions Here are some examples of typical rehabilitation exercises for your condition. Start each exercise slowly. Ease off the exercise if you start to have pain. Your doctor or physical therapist will tell you when you can start these exercises and which ones will work best for you. How to do the exercises Calf wall stretch (back knee straight) 1. Stand facing a wall with your hands on the wall at about eye level. Put your affected foot about a step behind your other foot. 2. Keeping your back leg straight and your back heel on the floor, bend your front knee and gently bring your hip and chest toward the wall until you feel a stretch in the calf of your back leg. 3. Hold the stretch for at least 15 to 30 seconds. 4. Repeat 2 to 4 times. Calf wall stretch (knees bent) 1. Stand facing a wall with your hands on the wall at about eye level. Put your affected foot about a step behind your other foot. 2. Keeping both heels on the floor, bend both knees. Then gently bring your hip and chest toward the wall until you feel a stretch in the calf of your back leg. 3. Hold the stretch for at least 15 to 30 seconds. 4. Repeat 2 to 4 times. [place] pick-ups 1. Put some marbles on the floor next to a cup. 
2. Sit in a chair, and use the toes of your affected foot to lift up one marble from the floor at a time. Then try to put the marble in the cup. 
3. Repeat 8 to 12 times. Towel scrunches 1. Sit in a chair, and place both feet on a towel on the floor. 2. Scrunch the towel toward you with your toes. Then use your toes to push the towel back into place. 3. Repeat 8 to 12 times. Towel inversion and eversion 1. Sit in a chair, and place both feet on a towel on the floor. 2. Swivel your feet from side to side to slide the towel. First slide your toes, then your heels, as you move the towel with your feet. Then change directions and swivel your feet from side to side to slide the towel back to the starting position. 3. Repeat 8 to 12 times. Resisted ankle inversion 1. Sit on the floor with your good foot crossed over your affected foot. 2. Hold both ends of an exercise band, and loop the band around the inside of your affected foot. Then press your other foot against the band. 3. Keeping your feet crossed, slowly push your affected foot against the band so that foot moves away from your other foot. Then slowly relax. 4. Repeat 8 to 12 times. Resisted ankle eversion 1. Sit on the floor with your legs straight. 2. Hold both ends of an exercise band, and loop the band around the outside of your affected foot. Then press your other foot against the band. 3. Keeping your leg straight, slowly push your affected foot outward against the band and away from your other foot without letting your leg rotate. Then slowly relax. 4. Repeat 8 to 12 times. Follow-up care is a key part of your treatment and safety. Be sure to make and go to all appointments, and call your doctor if you are having problems. It's also a good idea to know your test results and keep a list of the medicines you take. Where can you learn more? Go to http://michel-dallin.info/. Enter [id number] [id number] in the search box to learn more about \"Fifth Metatarsal Fracture: Rehab Exercises. \" Current as of: May 23, 2016 Content Version: 11.2 © 1632-4218 [employer].  Care instructions adapted under license by [employer] (which disclaims liability or warranty for this information). If you have questions about a medical condition or this instruction, always ask your healthcare professional. [address] any warranty or liability for your use of this information. Introducing [facility]! Dear Luna Marroquin: Thank you for requesting a [email or web address] account. Our records indicate that you already have an active [email or web address] account. You can access your account anytime at [email or web address] Did you know that you can access your hospital and ER discharge instructions at any time in [email or web address]? You can also review all of your test results from your hospital stay or ER visit. Additional Information If you have questions, please visit the Frequently Asked Questions section of the [email or web address] website at [email or web address] Remember, [email or web address] is NOT to be used for urgent needs. For medical emergencies, dial 911. Now available from your iPhone and Android! Please provide this summary of care documentation to your next provider. Your primary care clinician is listed as Roxana Cohen. If you have any questions after today's visit, please call [phone].

## 2017-04-25 NOTE — PROGRESS NOTES
1. Have you been to the ER, urgent care clinic since your last visit? Hospitalized since your last visit? No    2. Have you seen or consulted any other health care providers outside of the [address] [place] [address] since your last visit? Include any pap smears or colon screening.  No

## 2017-05-19 ENCOUNTER — TELEPHONE (OUTPATIENT)
Dept: ORTHOPEDIC SURGERY | Age: 57
End: 2017-05-19

## 2017-05-19 DIAGNOSIS — S92.352A CLOSED FRACTURE OF FIFTH METATARSAL BONE OF LEFT FOOT, PHYSEAL INVOLVEMENT UNSPECIFIED, INITIAL ENCOUNTER: Primary | ICD-10-CM

## 2017-05-19 NOTE — TELEPHONE ENCOUNTER
Have the order but need the fax to send. Will call the facility on Monday due to office being closed early on Friday.

## 2017-05-19 NOTE — TELEPHONE ENCOUNTER
Patient needs for Dr. Mabel Portillo to write order for physical therapy for her foot. fx 5th metatarsal. Therapist she is being treated by needs order. Please fax order to [facility] Att: Sol Saldaña. Patient can be reached at [phone], [facility] phone no is [phone].

## 2017-05-22 ENCOUNTER — TELEPHONE (OUTPATIENT)
Dept: ORTHOPEDIC SURGERY | Age: 57
End: 2017-05-22

## 2017-05-22 NOTE — TELEPHONE ENCOUNTER
Ms. Telly Roth called with the fax numbe for [facility] where she wants to go for therapy. See message of 05/19/17. Fax number for [facility] is [phone]. Ms. Telly Roth can be reached at [phone]. [facility]

## 2019-07-08 ENCOUNTER — APPOINTMENT (OUTPATIENT)
Dept: GENERAL RADIOLOGY | Age: 59
End: 2019-07-08
Attending: EMERGENCY MEDICINE
Payer: COMMERCIAL

## 2019-07-08 ENCOUNTER — HOSPITAL ENCOUNTER (EMERGENCY)
Age: 59
Discharge: HOME | End: 2019-07-08
Attending: EMERGENCY MEDICINE
Payer: COMMERCIAL

## 2019-07-08 VITALS
WEIGHT: 135 LBS | BODY MASS INDEX: 22.49 KG/M2 | TEMPERATURE: 98.2 F | OXYGEN SATURATION: 98 % | RESPIRATION RATE: 18 BRPM | SYSTOLIC BLOOD PRESSURE: 129 MMHG | DIASTOLIC BLOOD PRESSURE: 98 MMHG | HEART RATE: 90 BPM | HEIGHT: 65 IN

## 2019-07-08 DIAGNOSIS — K21.9 GASTROESOPHAGEAL REFLUX DISEASE WITHOUT ESOPHAGITIS: ICD-10-CM

## 2019-07-08 DIAGNOSIS — R07.9 CHEST PAIN, UNSPECIFIED TYPE: Primary | ICD-10-CM

## 2019-07-08 LAB
ALBUMIN SERPL-MCNC: 3.7 G/DL (ref 3.4–5)
ALBUMIN/GLOB SERPL: 0.9 {RATIO} (ref 0.8–1.7)
ALP SERPL-CCNC: 86 U/L (ref 45–117)
ALT SERPL-CCNC: 45 U/L (ref 13–56)
ANION GAP SERPL CALC-SCNC: 8 MMOL/L (ref 3–18)
AST SERPL-CCNC: 22 U/L (ref 15–37)
ATRIAL RATE: 89 BPM
BASOPHILS # BLD: 0 K/UL (ref 0–0.1)
BASOPHILS NFR BLD: 0 % (ref 0–2)
BILIRUB DIRECT SERPL-MCNC: 0.1 MG/DL (ref 0–0.2)
BILIRUB SERPL-MCNC: 0.4 MG/DL (ref 0.2–1)
BUN SERPL-MCNC: 24 MG/DL (ref 7–18)
BUN/CREAT SERPL: 26 (ref 12–20)
CALCIUM SERPL-MCNC: 9.6 MG/DL (ref 8.5–10.1)
CALCULATED P AXIS, ECG09: 63 DEGREES
CALCULATED R AXIS, ECG10: 11 DEGREES
CALCULATED T AXIS, ECG11: 46 DEGREES
CHLORIDE SERPL-SCNC: 103 MMOL/L (ref 100–108)
CO2 SERPL-SCNC: 27 MMOL/L (ref 21–32)
CREAT SERPL-MCNC: 0.93 MG/DL (ref 0.6–1.3)
DIAGNOSIS, 93000: NORMAL
DIFFERENTIAL METHOD BLD: NORMAL
EOSINOPHIL # BLD: 0 K/UL (ref 0–0.4)
EOSINOPHIL NFR BLD: 0 % (ref 0–5)
ERYTHROCYTE [DISTWIDTH] IN BLOOD BY AUTOMATED COUNT: 13.1 % (ref 11.6–14.5)
GLOBULIN SER CALC-MCNC: 3.9 G/DL (ref 2–4)
GLUCOSE SERPL-MCNC: 101 MG/DL (ref 74–99)
HCT VFR BLD AUTO: 43.1 % (ref 35–45)
HGB BLD-MCNC: 14.9 G/DL (ref 12–16)
LIPASE SERPL-CCNC: 176 U/L (ref 73–393)
LYMPHOCYTES # BLD: 2.6 K/UL (ref 0.9–3.6)
LYMPHOCYTES NFR BLD: 33 % (ref 21–52)
MCH RBC QN AUTO: 30.3 PG (ref 24–34)
MCHC RBC AUTO-ENTMCNC: 34.6 G/DL (ref 31–37)
MCV RBC AUTO: 87.6 FL (ref 74–97)
MONOCYTES # BLD: 0.7 K/UL (ref 0.05–1.2)
MONOCYTES NFR BLD: 9 % (ref 3–10)
NEUTS SEG # BLD: 4.7 K/UL (ref 1.8–8)
NEUTS SEG NFR BLD: 58 % (ref 40–73)
P-R INTERVAL, ECG05: 120 MS
PLATELET # BLD AUTO: 305 K/UL (ref 135–420)
PMV BLD AUTO: 10.1 FL (ref 9.2–11.8)
POTASSIUM SERPL-SCNC: 4.1 MMOL/L (ref 3.5–5.5)
PROT SERPL-MCNC: 7.6 G/DL (ref 6.4–8.2)
Q-T INTERVAL, ECG07: 358 MS
QRS DURATION, ECG06: 78 MS
QTC CALCULATION (BEZET), ECG08: 435 MS
RBC # BLD AUTO: 4.92 M/UL (ref 4.2–5.3)
SODIUM SERPL-SCNC: 138 MMOL/L (ref 136–145)
TROPONIN I SERPL-MCNC: <0.02 NG/ML (ref 0–0.04)
VENTRICULAR RATE, ECG03: 89 BPM
WBC # BLD AUTO: 8.1 K/UL (ref 4.6–13.2)

## 2019-07-08 PROCEDURE — 84484 ASSAY OF TROPONIN QUANT: CPT

## 2019-07-08 PROCEDURE — 74011250637 HC RX REV CODE- 250/637: Performed by: EMERGENCY MEDICINE

## 2019-07-08 PROCEDURE — 93005 ELECTROCARDIOGRAM TRACING: CPT

## 2019-07-08 PROCEDURE — 80076 HEPATIC FUNCTION PANEL: CPT

## 2019-07-08 PROCEDURE — C9113 INJ PANTOPRAZOLE SODIUM, VIA: HCPCS | Performed by: EMERGENCY MEDICINE

## 2019-07-08 PROCEDURE — 83690 ASSAY OF LIPASE: CPT

## 2019-07-08 PROCEDURE — 99284 EMERGENCY DEPT VISIT MOD MDM: CPT

## 2019-07-08 PROCEDURE — 74011250636 HC RX REV CODE- 250/636: Performed by: EMERGENCY MEDICINE

## 2019-07-08 PROCEDURE — 85025 COMPLETE CBC W/AUTO DIFF WBC: CPT

## 2019-07-08 PROCEDURE — 96374 THER/PROPH/DIAG INJ IV PUSH: CPT

## 2019-07-08 PROCEDURE — 80048 BASIC METABOLIC PNL TOTAL CA: CPT

## 2019-07-08 PROCEDURE — 71045 X-RAY EXAM CHEST 1 VIEW: CPT

## 2019-07-08 RX ORDER — PANTOPRAZOLE SODIUM 40 MG/10ML
40 INJECTION, POWDER, LYOPHILIZED, FOR SOLUTION INTRAVENOUS
Status: COMPLETED | OUTPATIENT
Start: 2019-07-08 | End: 2019-07-08

## 2019-07-08 RX ORDER — MAG HYDROX/ALUMINUM HYD/SIMETH 200-200-20
30 SUSPENSION, ORAL (FINAL DOSE FORM) ORAL
Status: COMPLETED | OUTPATIENT
Start: 2019-07-08 | End: 2019-07-08

## 2019-07-08 RX ORDER — SUCRALFATE 1 G/1
1 TABLET ORAL 4 TIMES DAILY
Qty: 30 TAB | Refills: 0 | Status: SHIPPED | OUTPATIENT
Start: 2019-07-08

## 2019-07-08 RX ORDER — PANTOPRAZOLE SODIUM 40 MG/1
40 TABLET, DELAYED RELEASE ORAL DAILY
Qty: 20 TAB | Refills: 0 | Status: SHIPPED | OUTPATIENT
Start: 2019-07-08 | End: 2019-07-08 | Stop reason: SDUPTHER

## 2019-07-08 RX ORDER — PANTOPRAZOLE SODIUM 40 MG/1
40 TABLET, DELAYED RELEASE ORAL DAILY
Qty: 20 TAB | Refills: 0 | Status: SHIPPED | OUTPATIENT
Start: 2019-07-08 | End: 2019-07-28

## 2019-07-08 RX ADMIN — ALUMINUM HYDROXIDE, MAGNESIUM HYDROXIDE, AND SIMETHICONE 30 ML: 200; 200; 20 SUSPENSION ORAL at 14:30

## 2019-07-08 RX ADMIN — PANTOPRAZOLE SODIUM 40 MG: 40 INJECTION, POWDER, FOR SOLUTION INTRAVENOUS at 14:30

## 2019-07-08 NOTE — ED NOTES
Pt asymptomatic at time of discharge. I have reviewed discharge instructions with the patient. The patient verbalized understanding. Pt agreeable to dc plan, pt in no distress at time of discharge, accompanied by /ride.

## 2019-07-08 NOTE — ED TRIAGE NOTES
Alert female arrives to ED by EMS with c/o chest pain, reports she was shopping in [address] when she felt the onset of mscp, radiating to back. Pt given 324 mg enroute, reports pain currently 0.5/10. Pt reports diaphoresis at onset of pain. No c/o sob, pt reports \"indigestion\".  Denies n/v.

## 2019-07-08 NOTE — ED PROVIDER NOTES
8year-old female complains of chest pain for the past 3 hours mild left-sided nonradiating denies any fevers chills nausea vomiting diarrhea shortness of breath neurologic symptoms or urinary deficits pain does not radiate nonchanging and nonexertional           Past Medical History:   Diagnosis Date    Arthritis     Asthma     Autoimmune disease ([facility]     colitis    Bronchitis     Contact dermatitis and other eczema, due to unspecified cause     Depression     Diabetes ([facility]     Hypercholesterolemia     Osteoarthritis of left hip 2016    Osteoarthritis of right hip 2016       Past Surgical History:   Procedure Laterality Date    HX  SECTION      HX HYSTERECTOMY      HX ORTHOPAEDIC Left 2016    hip replacement         Family History:   Problem Relation Age of Onset    Cancer Mother     Elevated Lipids Mother     Cancer Father     Heart Disease Father     Hypertension Father     Elevated Lipids Father        Social History     Socioeconomic History    Marital status:      Spouse name: Not on file    Number of children: Not on file    Years of education: Not on file    Highest education level: Not on file   Occupational History    Occupation: dance teacher   Social Needs    Financial resource strain: Not on file    Food insecurity:     Worry: Not on file     Inability: Not on file    Transportation needs:     Medical: Not on file     Non-medical: Not on file   Tobacco Use    Smoking status: Former Smoker    Smokeless tobacco: Never Used   Substance and Sexual Activity    Alcohol use:  Yes     Alcohol/week: 0.6 oz     Types: 1 Glasses of wine per week    Drug use: No    Sexual activity: Yes     Partners: Male     Birth control/protection: None   Lifestyle    Physical activity:     Days per week: Not on file     Minutes per session: Not on file    Stress: Not on file   Relationships    Social connections:     Talks on phone: Not on file Gets together: Not on file     Attends [employer] service: Not on file     Active member of club or organization: Not on file     Attends meetings of clubs or organizations: Not on file     Relationship status: Not on file    Intimate partner violence:     Fear of current or ex partner: Not on file     Emotionally abused: Not on file     Physically abused: Not on file     Forced sexual activity: Not on file   Other Topics Concern    Not on file   Social History Narrative    Not on file         ALLERGIES: Celebrex [celecoxib] and Nsaids (non-steroidal anti-inflammatory drug)    Review of Systems   HENT: Negative for rhinorrhea and sore throat. Respiratory: Negative for shortness of breath. Cardiovascular: Positive for chest pain. Gastrointestinal: Negative for abdominal pain. Genitourinary: Negative for dysuria. Musculoskeletal: Negative for neck pain. Skin: Negative for rash. All other systems reviewed and are negative. Vitals:    07/08/19 1403   BP: (!) 129/98   Pulse: 90   Resp: 18   Temp: 98.2 °F (36.8 °C)   SpO2: 98%   Weight: 61.2 kg (135 lb)   Height: 5' 5\" (1.651 m)            Physical Exam   Constitutional: She appears well-developed and well-nourished. No distress. HENT:   Head: Normocephalic. Neck: Normal range of motion. Cardiovascular: Normal rate and regular rhythm. Pulmonary/Chest: Effort normal and breath sounds normal.   Abdominal: Soft. Musculoskeletal: Normal range of motion. She exhibits no edema. Neurological: She is alert. Skin: Skin is warm. She is not diaphoretic. Psychiatric: She has a normal mood and affect. Nursing note and vitals reviewed.        MDM  Number of Diagnoses or Management Options  Diagnosis management comments: Patient feels better believe she is safe for discharge home and request discharge I discussed with her that I believe she is low risk for cardiac etiology however she is only had one set of negative enzymes and while this is a good sign it does not completely rule out cardiac she states she is fine with going home will follow with her primary care provider believe this is likely reflux and will treat as such. Troponin unremarkable.     EKG shows a normal sinus rhythm at a rate of 89 with normal intervals no obvious ischemia or ectopy, this EKG was interpreted by me         Procedures